# Patient Record
Sex: FEMALE | Race: WHITE | URBAN - METROPOLITAN AREA
[De-identification: names, ages, dates, MRNs, and addresses within clinical notes are randomized per-mention and may not be internally consistent; named-entity substitution may affect disease eponyms.]

---

## 2019-07-17 ENCOUNTER — OFFICE VISIT (OUTPATIENT)
Dept: FAMILY MEDICINE CLINIC | Facility: CLINIC | Age: 54
End: 2019-07-17

## 2019-07-17 VITALS
HEART RATE: 72 BPM | SYSTOLIC BLOOD PRESSURE: 120 MMHG | WEIGHT: 148 LBS | TEMPERATURE: 97 F | RESPIRATION RATE: 16 BRPM | OXYGEN SATURATION: 98 % | DIASTOLIC BLOOD PRESSURE: 84 MMHG | BODY MASS INDEX: 31.07 KG/M2 | HEIGHT: 58 IN

## 2019-07-17 DIAGNOSIS — J30.9 ALLERGIC RHINITIS, UNSPECIFIED SEASONALITY, UNSPECIFIED TRIGGER: ICD-10-CM

## 2019-07-17 DIAGNOSIS — J20.9 ACUTE BRONCHITIS, UNSPECIFIED ORGANISM: Primary | ICD-10-CM

## 2019-07-17 PROCEDURE — 99202 OFFICE O/P NEW SF 15 MIN: CPT | Performed by: NURSE PRACTITIONER

## 2019-07-17 RX ORDER — METHYLPREDNISOLONE 4 MG/1
TABLET ORAL
Qty: 1 PACKAGE | Refills: 0 | Status: SHIPPED | OUTPATIENT
Start: 2019-07-17

## 2019-07-17 RX ORDER — BENZONATATE 100 MG/1
100 CAPSULE ORAL 3 TIMES DAILY PRN
Qty: 30 CAPSULE | Refills: 1 | Status: SHIPPED | OUTPATIENT
Start: 2019-07-17

## 2019-07-17 NOTE — PROGRESS NOTES
CHIEF COMPLAINT:   Patient presents with:  Cough: dry cough, X 1-2 months       HPI:   Gerard Ferro is a 49year old female accompanied by her daughter who presents for upper and lower respiratory symptoms for  6 weeks.  Patient primarily speaks [place] a EARS: TM's gray, no bulging, no retraction,no fluid, bony landmarks visible  NOSE: Nostrils patent, clear nasal discharge, nasal mucosa pale and swollen   THROAT: Oral mucosa pink, moist. Posterior pharynx is non erythematous. no exudates. Tonsils 1/4. You have a viral bronchitis. Bronchitis is inflammation and swelling of the lining of the lungs. This is often caused by an infection. Symptoms include a dry, hacking cough that is worse at night. The cough may bring up yellow-green mucus.  You may also fee · Over-the-counter cough, cold, and sore-throat medicines will not shorten the length of the illness, but they may help to reduce symptoms. Don't use decongestants if you have high blood pressure.   Follow-up care  Follow up with your healthcare provider, o Bronchitis that is caused by a virus is not treated with antibiotics. Instead, medicines may be given to help relieve symptoms. Symptoms can last up to 2 weeks, although the cough may last much longer.   This illness is contagious during the first few days If you are age 72 or older, or if you have a chronic lung disease or condition that affects your immune system, or you smoke, ask your healthcare provider about getting a pneumococcal vaccine and a yearly flu shot (influenza vaccine).   When to seek medical Wi?kszo? ? chorób wirusowych ust?puje w ci?gu 10–14 dni, podczas których pacjent g?ównie arnulfo oraz stosuje proste, huange sposoby ben. Jednak w niektórych przypadkach mo?e to trwa? namarcust osvaldo lee.   Mohinder w domu  · Je?li objawy s? sabra, od Raymond guadarrama 66 lat i starsze, cierpi?ce na przewlek? ? chorob? p?uc lub uk?jadon odporno? ciowego, lub pal?ce powinny zapyta? lekarza o mo?liwo? ? przyj?milton szczepienia na pneumokoki, a colt?e zaszczepienia si? juan haynes.   Jovita Specter? lacey davila Josephine modestae? si?, co ciebie [place], mo?esz wykona? testy. Mo?esz zosta? gayatrierocory do alergologa w shanika forrester testów i jesus alberto menjivar.   Mohinder Ji mo?e zapisa? leki pomagaj?ce z?agodzi? objawy aleangeline.   [place], pankaj vizcaino? substa · Gor?czka przekraczaj?ca 100,4°F (38°C)  · Nieust?puj?ce objawy, nowe objawy lub nasilaj?ce si? objawy  Zadzwo? natychmiast na 112, je?sanjiv [place]:  · K?opoty z oddychaniem  · Pokrzywk? (czerwone b?beljagruti na earle)  · Siln?  opuchlizn? sabra bradley?shaggy

## 2019-07-17 NOTE — PATIENT INSTRUCTIONS
You can take an over the counter antihistamine such as Zyrtec for runny nose. Follow up if no improvement in 1 week. Follow up sooner for any worsening symptoms. Viral Bronchitis (Adult)    You have a viral bronchitis.  Bronchitis is inflammation an · Your appetite may be poor, so a light diet is fine. Avoid dehydration by drinking 6 to 8 glasses of fluids per day (such as water, soft drinks, sports drinks, juices, tea, or soup). Extra fluids will help loosen secretions in the nose and lungs.   · Over- You have a viral bronchitis. Bronchitis is inflammation and swelling of the lining of the lungs. This is often caused by an infection. Symptoms include a dry, hacking cough that is worse at night. The cough may bring up yellow-green mucus.  You may also fee · Over-the-counter cough, cold, and sore-throat medicines will not shorten the length of the illness, but they may help to reduce symptoms. Don't use decongestants if you have high blood pressure.   Follow-up care  Follow up with your healthcare provider, o Zapalenia oskrzeli wywo?anego przez wighassansy stephen leczy si? antybiotykami. W takim przypadku podaje si? Clifton Odonnell ?agodz?ce objawy choroby. Objawy mog? utrzymywa? si? do 2 tygodni, oprócz kennu, który mo?e trwa? o wiele d?u?ej.   Choroba ta jest davy?marjorie adhikari pi · Leki na przezi?bienie dost?pne bez recepty stephen skróc? choroby, jednak mog? pomóc w przypadku kaszlu, bólu aman? a, stephen?ytu nosa i zatok. Osoby z wysokim ci?nieniem krwi stephen powinny stosowa? ?rodków udra?niaj? cych górne drogi oddechowe.   Dalsze post?powani Alechristian stephen?yt nosa to reakcja comparrameshzna, która dotyka nos i cz?sto równie? oczy. Jest cz?sto otoniel armas. Hilda mederos jest cz?sto wywo?jose adhikari substancje wyst?puj?ce w ?Rella Sabrina s? wdychane. W zale?no?ci od tego, na co jeste? · Utrzymuj nisk? wilgotno? ? powietrza za pomoc? channing ramirez. Martin hwang od ple?ni. · Wyczy? ? obszary ple?ni za pomoc? Demetrio murray. [place]:  · Linda marx.  Edy?

## 2020-06-09 ENCOUNTER — VIRTUAL PHONE E/M (OUTPATIENT)
Dept: FAMILY MEDICINE CLINIC | Facility: CLINIC | Age: 55
End: 2020-06-09

## 2020-06-09 DIAGNOSIS — J20.9 ACUTE BRONCHITIS, UNSPECIFIED ORGANISM: Primary | ICD-10-CM

## 2020-06-09 PROCEDURE — 99442 PHONE E/M BY PHYS 11-20 MIN: CPT | Performed by: PHYSICIAN ASSISTANT

## 2020-06-09 RX ORDER — ALBUTEROL SULFATE 90 UG/1
AEROSOL, METERED RESPIRATORY (INHALATION)
Qty: 1 INHALER | Refills: 0 | Status: SHIPPED | OUTPATIENT
Start: 2020-06-09

## 2020-06-09 RX ORDER — METHYLPREDNISOLONE 4 MG/1
TABLET ORAL
Qty: 1 PACKAGE | Refills: 0 | Status: SHIPPED | OUTPATIENT
Start: 2020-06-09

## 2020-06-09 RX ORDER — AZITHROMYCIN 250 MG/1
TABLET, FILM COATED ORAL
Qty: 6 TABLET | Refills: 0 | Status: SHIPPED | OUTPATIENT
Start: 2020-06-09 | End: 2020-06-14

## 2020-06-09 NOTE — PROGRESS NOTES
Virtual Telephone Check-In    Blas Fuentes verbally consents to a Virtual/Telephone Check-In visit on 06/09/20. Patient has been referred to the [facility] website at [email or web address] to review the yearly Consent to Treat document.     Patient underst